# Patient Record
Sex: FEMALE | Race: BLACK OR AFRICAN AMERICAN | NOT HISPANIC OR LATINO | Employment: FULL TIME | URBAN - METROPOLITAN AREA
[De-identification: names, ages, dates, MRNs, and addresses within clinical notes are randomized per-mention and may not be internally consistent; named-entity substitution may affect disease eponyms.]

---

## 2017-09-11 ENCOUNTER — OFFICE VISIT (OUTPATIENT)
Dept: INTERNAL MEDICINE | Facility: MEDICAL CENTER | Age: 47
End: 2017-09-11
Payer: MEDICAID

## 2017-09-11 VITALS
WEIGHT: 206 LBS | BODY MASS INDEX: 33.11 KG/M2 | HEIGHT: 66 IN | HEART RATE: 92 BPM | OXYGEN SATURATION: 98 % | DIASTOLIC BLOOD PRESSURE: 80 MMHG | SYSTOLIC BLOOD PRESSURE: 110 MMHG | TEMPERATURE: 97.5 F

## 2017-09-11 DIAGNOSIS — R73.03 PRE-DIABETES: ICD-10-CM

## 2017-09-11 DIAGNOSIS — Z00.00 HEALTH CARE MAINTENANCE: ICD-10-CM

## 2017-09-11 DIAGNOSIS — E78.5 HYPERLIPIDEMIA, UNSPECIFIED HYPERLIPIDEMIA TYPE: ICD-10-CM

## 2017-09-11 DIAGNOSIS — Z87.42 HISTORY OF OVARIAN CYST: ICD-10-CM

## 2017-09-11 DIAGNOSIS — B00.9 HERPES SIMPLEX: ICD-10-CM

## 2017-09-11 DIAGNOSIS — I10 ESSENTIAL HYPERTENSION: ICD-10-CM

## 2017-09-11 DIAGNOSIS — F32.A DEPRESSION, UNSPECIFIED DEPRESSION TYPE: ICD-10-CM

## 2017-09-11 PROCEDURE — 99204 OFFICE O/P NEW MOD 45 MIN: CPT | Mod: GC | Performed by: INTERNAL MEDICINE

## 2017-09-11 RX ORDER — ACYCLOVIR 800 MG/1
800 TABLET ORAL 2 TIMES DAILY PRN
COMMUNITY
End: 2017-09-11 | Stop reason: SDUPTHER

## 2017-09-11 RX ORDER — POTASSIUM CITRATE 10 MEQ/1
10 TABLET, EXTENDED RELEASE ORAL DAILY
COMMUNITY
End: 2018-02-22

## 2017-09-11 RX ORDER — VALSARTAN AND HYDROCHLOROTHIAZIDE 80; 12.5 MG/1; MG/1
1 TABLET, FILM COATED ORAL DAILY
COMMUNITY
End: 2017-09-11 | Stop reason: SDUPTHER

## 2017-09-11 RX ORDER — PAROXETINE HYDROCHLORIDE 20 MG/1
20 TABLET, FILM COATED ORAL DAILY
COMMUNITY
End: 2018-02-22

## 2017-09-11 RX ORDER — VALSARTAN AND HYDROCHLOROTHIAZIDE 80; 12.5 MG/1; MG/1
1 TABLET, FILM COATED ORAL DAILY
Qty: 30 TAB | Refills: 1 | Status: SHIPPED | OUTPATIENT
Start: 2017-09-11 | End: 2017-10-19 | Stop reason: SDUPTHER

## 2017-09-11 RX ORDER — ACYCLOVIR 800 MG/1
800 TABLET ORAL 2 TIMES DAILY PRN
Qty: 20 TAB | Refills: 0 | Status: SHIPPED | OUTPATIENT
Start: 2017-09-11 | End: 2017-10-19

## 2017-09-11 ASSESSMENT — PATIENT HEALTH QUESTIONNAIRE - PHQ9: CLINICAL INTERPRETATION OF PHQ2 SCORE: 0

## 2017-09-11 NOTE — LETTER
[employer]  Caty Shepherd M.D.  [address]  [place] [address]  Fax: [phone]   Authorization for Release/Disclosure of   Protected Health Information   Name: JORGITO NEWMAN : 1970 SSN: [id number]   Address: [address]  [place] [address] Phone:    [phone] (home)    I authorize the entity listed below to release/disclose the PHI below to:   [facility]/Caty Shepherd M.D. and Caty Shepherd M.D.   Provider or Entity Name:Dr Rsaheed Hawkins   Address                      [address]  City, State, [place], [place]   Phone:[phone]      Fax:     Reason for request: continuity of care   Information to be released:    [  ] LAST COLONOSCOPY,  including any PATH REPORT and follow-up  [  ] LAST FIT/COLOGUARD RESULT [  ] LAST DEXA  [  ] LAST MAMMOGRAM  [ X ] LAST PAP  [  ] LAST LABS [  ] RETINA EXAM REPORT  [  ] IMMUNIZATION RECORDS  [ X ] Release all info      [  ] Check here and initial the line next to each item to release ALL health information INCLUDING  _____ Care and treatment for drug and / or alcohol abuse  _____ HIV testing, infection status, or AIDS  _____ Genetic Testing    DATES OF SERVICE OR TIME PERIOD TO BE DISCLOSED: _____________  I understand and acknowledge that:  * This Authorization may be revoked at any time by you in writing, except if your health information has already been used or disclosed.  * Your health information that will be used or disclosed as a result of you signing this authorization could be re-disclosed by the recipient. If this occurs, your re-disclosed health information may no longer be protected by State or Federal laws.  * You may refuse to sign this Authorization. Your refusal will not affect your ability to obtain treatment.  * This Authorization becomes effective upon signing and will  on (date) __________.      If no date is indicated, this Authorization will  one (1) year from the signature date.    Name:  Elizabeth Bah    Signature:   Date:     9/11/2017       PLEASE FAX REQUESTED RECORDS BACK TO: [phone]

## 2017-09-11 NOTE — LETTER
[employer]  Caty Shepherd M.D.  [address] [facility] [facility]  [place] [address]  Fax: [phone]   Authorization for Release/Disclosure of   Protected Health Information   Name: JORGITO NEWMAN : 1970 SSN: [id number]   Address: [address]  [place] [address] Phone:    [phone] (home)    I authorize the entity listed below to release/disclose the PHI below to:   [facility]/Caty Shepherd M.D. and Caty Shepherd M.D.   Provider or Entity Name:[facility]     Address                        [address]  City, State, [place], [place]   Phone:[phone]      Fax:     Reason for request: continuity of care   Information to be released:    [  ] LAST COLONOSCOPY,  including any PATH REPORT and follow-up  [  ] LAST FIT/COLOGUARD RESULT [  ] LAST DEXA  [  ] LAST MAMMOGRAM  [X ] LAST PAP  [ X] LAST LABS [  ] RETINA EXAM REPORT  [  ] IMMUNIZATION RECORDS  [ X ] Release all info      [  ] Check here and initial the line next to each item to release ALL health information INCLUDING  _____ Care and treatment for drug and / or alcohol abuse  _____ HIV testing, infection status, or AIDS  _____ Genetic Testing    DATES OF SERVICE OR TIME PERIOD TO BE DISCLOSED: _____________  I understand and acknowledge that:  * This Authorization may be revoked at any time by you in writing, except if your health information has already been used or disclosed.  * Your health information that will be used or disclosed as a result of you signing this authorization could be re-disclosed by the recipient. If this occurs, your re-disclosed health information may no longer be protected by State or Federal laws.  * You may refuse to sign this Authorization. Your refusal will not affect your ability to obtain treatment.  * This Authorization becomes effective upon signing and will  on (date) __________.      If no date is indicated, this Authorization will  one (1) year from the signature  date.    Name: Elizabeth RamandeepBotello    Signature:   Date:     9/11/2017       PLEASE FAX REQUESTED RECORDS BACK TO: [phone]

## 2017-09-11 NOTE — PROGRESS NOTES
New Patient to Establish    Reason to establish: New patient to establish    CC: Weight gain in spite of decreased appetite    HPI: Patient is a pleasant 47 year old with past medical history of hypertension, impaired glucose tolerance, hyperlipidemia, ovarian cyst who is here to establish care. Patient just moved to [place] from [place] last month. Today she  reports a decreased appetite and weight gain in spite of that going on for a couple of months. She thinks she is gaining weight around her waist.  Denies dry skin, constipation, sensitivity to cold, sluggishness. Patient also reports feeling thirsty all the time and drinking a lot of water- a full case of 500 ml bottles at times. Denies increased urination, blurry vision.  Patient has not had periods for the last 2 years. She thinks it's related to her history of ovarian cyst. Patient has hot flashes, no vaginal dryness or sleep disturbances. She had labs drawn in April which shows a high FSH. It was explained to the patient that she is going through menopause.     There are no active problems to display for this patient.      Past Medical History:   Diagnosis Date   • Depression    • Hypertension        Current Outpatient Prescriptions   Medication Sig Dispense Refill   • potassium citrate SR (UROCIT-K SR) 10 MEQ (1080 MG) Tab CR Take 10 mEq by mouth every day.     • paroxetine (PAXIL) 20 MG Tab Take 20 mg by mouth every day.     • valsartan-hydrochlorothiazide (DIOVAN-HCT) 80-12.5 MG per tablet Take 1 Tab by mouth every day. 30 Tab 1   • acyclovir (ZOVIRAX) 800 MG Tab Take 1 Tab by mouth 2 times a day as needed (for 6 days when start of symptoms). 20 Tab 0     No current facility-administered medications for this visit.        Allergies as of 09/11/2017   • (Not on File)       Social History     Social History   • Marital status: Single     Spouse name: N/A   • Number of children: N/A   • Years of education: N/A     Occupational History   • Not on file.  "    Social History Main Topics   • Smoking status: Never Smoker   • Smokeless tobacco: Never Used   • Alcohol use Not on file   • Drug use: Unknown   • Sexual activity: Not on file     Other Topics Concern   • Not on file     Social History Narrative   • No narrative on file     Family history:    Maternal grandfather and paternal grandmother- Diabetes  Mother- hypertension    No past surgical history on file.    ROS: As per HPI. Additional pertinent symptoms as noted below.    Eyes: No vision changes, eye redness  ENT: No sore throat, rhinorrhea, ear discharge  Cardiovascular: No chest pain, palpitations, dizziness  Respiratory: no cough, pleuritic chest pain, dyspnea  GI: No abdominal pain, nausea, vomiting, changes in bowel habits  : No urinary frequency, dysuria  Musculo-skeletal: No joint pain or aches  Neurological: No headache, numbness/tingling  Psychological: history of depression    /80   Pulse 92   Temp 36.4 °C (97.5 °F)   Ht 1.676 m (5' 6\")   Wt 93.4 kg (206 lb)   SpO2 98%   BMI 33.25 kg/m²     Physical Exam  General:  Alert and oriented, No apparent distress.    Eyes: Pupils equal and reactive. No scleral icterus.    Throat: Clear no erythema or exudates noted.    Neck: Supple. No lymphadenopathy noted. Thyroid not enlarged.    Lungs: Clear to auscultation and percussion bilaterally.    Cardiovascular: Regular rate and rhythm. No murmurs, rubs or gallops.    Abdomen:  Benign. No rebound or guarding noted.    Extremities: No clubbing, cyanosis, edema.    Skin: Clear. No rash or suspicious skin lesions noted.      Note: I have reviewed all pertinent labs and diagnostic tests associated with this visit with specific comments listed under the assessment and plan below    Assessment and Plan    1. Essential hypertension  - BP in the clinic today is stable- 110/80  - Valsartan/HCTZ-80/12.5 medication refill provided  - Advised patient to monitor BP at home,  - Healthy diet and exercise     2. " History of ovarian cyst  - Patient has a history of ovarian cyst which was removed last August in [place]. As per patient, her doctor wanted to do a transvaginal ultrasound which she could not get done as she was moving.  Plan:  - Obtain transvaginal ultrasound  - Refer to gynecology if found abnormal  - Will obtain records from [place]    3.. Pre-diabetes  - The symptoms of polyuria are most likely related to hyperglycemia  - Will obtain fasting glucose, HbA1c    4. Hyperlipidemia, unspecified hyperlipidemia type  - Fasting lipid panel    5. Depression, unspecified depression type  - Patient was prescribed paroxetine by her doctor in [place], but she never took the medication  - States that her symptoms are related to stress in her life  Plan:  - referral to psychology as patient wants to try therapy before medications    6. Herpes simplex  - Per patient report, she has been diagnosed with HSV 1 and 2, she takes acyclovir as needed    7. Health care maintenance  - Patient had a pap smear last year, will obtain records  - Will obtain a screening mammogram  - She declined the influenza shot      Followup: Return in about 4 weeks (around 10/9/2017).    Signed by: Caty Shepherd M.D.

## 2017-09-14 DIAGNOSIS — Z87.42 HISTORY OF OVARIAN CYST: ICD-10-CM

## 2017-10-02 ENCOUNTER — APPOINTMENT (OUTPATIENT)
Dept: RADIOLOGY | Facility: MEDICAL CENTER | Age: 47
End: 2017-10-02
Attending: STUDENT IN AN ORGANIZED HEALTH CARE EDUCATION/TRAINING PROGRAM
Payer: MEDICAID

## 2017-10-02 DIAGNOSIS — Z87.42 HISTORY OF OVARIAN CYST: ICD-10-CM

## 2017-10-02 PROCEDURE — 76830 TRANSVAGINAL US NON-OB: CPT

## 2017-10-16 ENCOUNTER — APPOINTMENT (OUTPATIENT)
Dept: INTERNAL MEDICINE | Facility: MEDICAL CENTER | Age: 47
End: 2017-10-16
Payer: MEDICAID

## 2017-10-19 ENCOUNTER — OFFICE VISIT (OUTPATIENT)
Dept: INTERNAL MEDICINE | Facility: MEDICAL CENTER | Age: 47
End: 2017-10-19
Payer: MEDICAID

## 2017-10-19 VITALS
WEIGHT: 205 LBS | OXYGEN SATURATION: 95 % | BODY MASS INDEX: 32.95 KG/M2 | SYSTOLIC BLOOD PRESSURE: 133 MMHG | DIASTOLIC BLOOD PRESSURE: 80 MMHG | HEIGHT: 66 IN | TEMPERATURE: 97 F | HEART RATE: 107 BPM | RESPIRATION RATE: 16 BRPM

## 2017-10-19 DIAGNOSIS — R07.9 CHEST PAIN, UNSPECIFIED TYPE: ICD-10-CM

## 2017-10-19 DIAGNOSIS — R73.03 PRE-DIABETES: ICD-10-CM

## 2017-10-19 DIAGNOSIS — I10 ESSENTIAL HYPERTENSION: ICD-10-CM

## 2017-10-19 DIAGNOSIS — E78.5 HYPERLIPIDEMIA, UNSPECIFIED HYPERLIPIDEMIA TYPE: ICD-10-CM

## 2017-10-19 DIAGNOSIS — B00.9 HERPES SIMPLEX INFECTION: ICD-10-CM

## 2017-10-19 DIAGNOSIS — R63.5 WEIGHT GAIN: ICD-10-CM

## 2017-10-19 PROCEDURE — 99214 OFFICE O/P EST MOD 30 MIN: CPT | Mod: GC | Performed by: INTERNAL MEDICINE

## 2017-10-19 RX ORDER — VALSARTAN AND HYDROCHLOROTHIAZIDE 80; 12.5 MG/1; MG/1
1 TABLET, FILM COATED ORAL DAILY
Qty: 30 TAB | Refills: 3 | Status: SHIPPED | OUTPATIENT
Start: 2017-10-19 | End: 2018-01-11 | Stop reason: SDUPTHER

## 2017-10-19 RX ORDER — ACYCLOVIR 400 MG/1
400 TABLET ORAL DAILY
Qty: 30 TAB | Refills: 3 | Status: SHIPPED | OUTPATIENT
Start: 2017-10-19 | End: 2018-12-26 | Stop reason: SDUPTHER

## 2017-10-19 ASSESSMENT — PAIN SCALES - GENERAL: PAINLEVEL: NO PAIN

## 2017-10-19 NOTE — LETTER
[employer]  Caty Shepherd M.D.  [address]  [place] [address]  Fax: [phone]   Authorization for Release/Disclosure of   Protected Health Information   Name: ELIZABETH NEWMAN : 1970 SSN: [id number]   Address: [address]  [place] [address] Phone:    [phone] (home)    I authorize the entity listed below to release/disclose the PHI below to:   [employer]/Caty Shepherd M.D. and Caty Shepherd M.D.   Provider or Entity Name:     Address   City, State, Zip   Phone:      Fax:     Reason for request: continuity of care   Information to be released:    [  ] LAST COLONOSCOPY,  including any PATH REPORT and follow-up  [  ] LAST FIT/COLOGUARD RESULT [  ] LAST DEXA  [  ] LAST MAMMOGRAM  [  ] LAST PAP  [  ] LAST LABS [  ] RETINA EXAM REPORT  [  ] IMMUNIZATION RECORDS  [  ] Release all info      [  ] Check here and initial the line next to each item to release ALL health information INCLUDING  _____ Care and treatment for drug and / or alcohol abuse  _____ HIV testing, infection status, or AIDS  _____ Genetic Testing    DATES OF SERVICE OR TIME PERIOD TO BE DISCLOSED: _____________  I understand and acknowledge that:  * This Authorization may be revoked at any time by you in writing, except if your health information has already been used or disclosed.  * Your health information that will be used or disclosed as a result of you signing this authorization could be re-disclosed by the recipient. If this occurs, your re-disclosed health information may no longer be protected by State or Federal laws.  * You may refuse to sign this Authorization. Your refusal will not affect your ability to obtain treatment.  * This Authorization becomes effective upon signing and will  on (date) __________.      If no date is indicated, this Authorization will  one (1) year from the signature date.    Name: Elizabeth Newman    Signature:   Date:     10/19/2017       PLEASE FAX REQUESTED  RECORDS BACK TO: [phone]

## 2017-10-19 NOTE — LETTER
[employer]  Caty Shepherd M.D.  [address]  [place] [address]  Fax: [phone]   Authorization for Release/Disclosure of   Protected Health Information   Name: ELIZABETH NEWMAN : 1970 SSN: [id number]   Address: [address]  [place] [address] Phone:    [phone] (home)    I authorize the entity listed below to release/disclose the PHI below to:   [employer]/Caty Shepherd M.D. and Caty Shepherd M.D.   Provider or Entity Name:     Address   City, State, Zip   Phone:      Fax:     Reason for request: continuity of care   Information to be released:    [  ] LAST COLONOSCOPY,  including any PATH REPORT and follow-up  [  ] LAST FIT/COLOGUARD RESULT [  ] LAST DEXA  [  ] LAST MAMMOGRAM  [  ] LAST PAP  [  ] LAST LABS [  ] RETINA EXAM REPORT  [  ] IMMUNIZATION RECORDS  [  ] Release all info      [  ] Check here and initial the line next to each item to release ALL health information INCLUDING  _____ Care and treatment for drug and / or alcohol abuse  _____ HIV testing, infection status, or AIDS  _____ Genetic Testing    DATES OF SERVICE OR TIME PERIOD TO BE DISCLOSED: _____________  I understand and acknowledge that:  * This Authorization may be revoked at any time by you in writing, except if your health information has already been used or disclosed.  * Your health information that will be used or disclosed as a result of you signing this authorization could be re-disclosed by the recipient. If this occurs, your re-disclosed health information may no longer be protected by State or Federal laws.  * You may refuse to sign this Authorization. Your refusal will not affect your ability to obtain treatment.  * This Authorization becomes effective upon signing and will  on (date) __________.      If no date is indicated, this Authorization will  one (1) year from the signature date.    Name: Elizabeth Newman    Signature:   Date:     10/19/2017       PLEASE FAX REQUESTED  RECORDS BACK TO: [phone]

## 2017-10-20 NOTE — PROGRESS NOTES
"      Established Patient    Elizabeth presents today with the following:    CC: Chest pain    HPI: Patient is a 47 year old with past medical history of hypertension, pre diabetes, hyperlipidemia and herpes simplex .     Chest pain: Patient reports having intermittent chest pain for one week sometime in the last month. She describes it as substernal pressure type of pain which radiated to her left arm . The pain occurred on exertion ( walking, moving boxes) and lasted a few seconds each time. She had associated shortness of breath. Patient reports being admitted to a hospital ([facility]) in [place] for similar chest pain about 3-4 years back during which time she had a stress test and angiogram. She states that she \" failed\" her stress test.   She was reportedly referred to cardiology. Patient however did not follow up on the referral. She does not remember the results of her stress test or angiogram. She denies ever being asked to take an aspirin or statin. She does not have a chest pain at this time.    Herpes simplex: Patient reports both genital herpes and cold sores. She reports frequent infections as a result of which she now takes acyclovir almost everyday. She denies any symptoms currently.    Weight gain: Patient is concerned about weight gain in spite of not eating too much. She states that she usually weighs around 165-170 lbs and the only time she went above 200 was during her pregnancy. She has not started exercising yet. She is interested in obtaining help from nutrition services to monitor her diet.    Patient Active Problem List    Diagnosis Date Noted   • Essential hypertension 09/11/2017   • History of ovarian cyst 09/11/2017       Current Outpatient Prescriptions   Medication Sig Dispense Refill   • aspirin EC (ECOTRIN) 81 MG Tablet Delayed Response Take 1 Tab by mouth every day. 30 Tab 3   • acyclovir (ZOVIRAX) 400 MG tablet Take 1 Tab by mouth every day. 30 Tab 3   • " "valsartan-hydrochlorothiazide (DIOVAN-HCT) 80-12.5 MG per tablet Take 1 Tab by mouth every day. 30 Tab 3   • potassium citrate SR (UROCIT-K SR) 10 MEQ (1080 MG) Tab CR Take 10 mEq by mouth every day.     • paroxetine (PAXIL) 20 MG Tab Take 20 mg by mouth every day.       No current facility-administered medications for this visit.        ROS: As per HPI. Additional pertinent symptoms as noted below.    Constitutional: No fever, chills, malaise  ENT: No sore throat, headache  Cardiovascular: As per HPI. No palpitations  Respiratory: no cough, sputum production, hemoptsysis  GI: No abdominal pain, nausea, vomiting, diarrhea  : No frequency, urgency, dysuria  Musculo-skeletal: no joint pains  Neurological: No headache, motor weakness, numbness/tingling    /80   Pulse (!) 107   Temp 36.1 °C (97 °F)   Resp 16   Ht 1.676 m (5' 6\")   Wt 93 kg (205 lb)   LMP 10/19/2017   SpO2 95%   Breastfeeding? No   BMI 33.09 kg/m²     Physical Exam   Constitutional:  oriented to person, place, and time. No distress.   Eyes: Pupils are equal, round, and reactive to light. No scleral icterus.  Neck: Neck supple. No thyromegaly present.   Cardiovascular: Normal rate, regular rhythm and normal heart sounds.  Exam reveals no gallop and no friction rub.  No murmur heard.  Pulmonary/Chest: Breath sounds normal. Chest wall is not dull to percussion.   Musculoskeletal:   no edema.   Lymphadenopathy: no cervical adenopathy  Neurological: alert and oriented to person, place, and time.   Skin: No cyanosis. Nails show no clubbing.    Note: I have reviewed all pertinent labs and diagnostic tests associated with this visit with specific comments listed under the assessment and plan below    Assessment and Plan    1. Chest pain, unspecified type  - Concerning for cardiac origin of chest pain considering patient's description of typical chest pain and reported previous history of an abnormal stress test.  - Patient's risk factors include " hypertension, possible hyperlipidemia and pre diabetes. She is a non smoker  - We would like to get a stress test at this time , but patient declined the test.  - Patient was explained about the likely cardiac origin of the chest pain and the need for further work up. She understands the risks of not going ahead with the workup.  - will obtain records from [facility]  - Prescribed aspirin 81 mg daily   - Advised patient to go to the ER in case of chest pain.    2. Weight gain  - Weight gain of 30-40 lbs over the past year in spite of decreased appetite  - Basic labs, TSH ordered  - Referral to nutrition services   - Advised healthy diet and exercise    3. Herpes simplex infection  - Reports frequent infection warranting almost daily acyclovir  - Order changed to 400 mg daily as suppressive therapy    4. Essential hypertension  - BP stable  - Continue valsartan/HCTZ   - Regular exercise    5. Hyperlipidemia, unspecified hyperlipidemia type  - Patient not currently on a statin  - Lipid panel ordered    6. Pre-diabetes  - Patient reports having being diagnosed with pre diabtes  - No previous records available  - Will wait for the results of HbA1c and BMP      Followup: Return in about 5 weeks (around 11/23/2017).      Signed by: Caty Shepherd M.D.

## 2017-11-22 ENCOUNTER — APPOINTMENT (OUTPATIENT)
Dept: INTERNAL MEDICINE | Facility: MEDICAL CENTER | Age: 47
End: 2017-11-22
Payer: MEDICAID

## 2018-01-02 ENCOUNTER — TELEPHONE (OUTPATIENT)
Dept: INTERNAL MEDICINE | Facility: MEDICAL CENTER | Age: 48
End: 2018-01-02

## 2018-01-02 NOTE — TELEPHONE ENCOUNTER
Received fax from [facility] in regards to patient medical record release.  On the form we faxed over for patient we did not write whom we are trying to receive the records from, also we don't have the correct number or fax number on the form.  There is no documentation on patient's chart of where we need the records from.    Can you please verify from me that we do need the records from [facility], and not another place.  I don't want to send patients information to the wrong company.  I tried calling patient to ask her where we needed to get the records from and her phone is disconnected.

## 2018-01-02 NOTE — LETTER
January 3, 2018        Elizabeth Bah    Dear Mrs. Bah,   I am mailing a new request of information for you to sign.  The one that you signed for us back on 10/19/17 is not accepted because we did not write the correct name of the Facility on the form.  Please sign the form and you can mail it to them at the address that is provided on the form for their facility, or if you like you can mail it back to us or you can drop it off at our clinic and I will fax it over to them.  If you have any questions please feel free to reach me at [phone] and ask for Dr. Shepherd's medical assistant.         Thank you,          Loyda Taylor

## 2018-01-03 NOTE — TELEPHONE ENCOUNTER
In my notes from her last visit with me, I have documented that we need to obtain medical records from [facility]. Would you be able to obtain records from there?     Thanks,   Caty (Routing comment)

## 2018-01-03 NOTE — TELEPHONE ENCOUNTER
I will mail the patient a new Release of information so she can sign it and I can fax it to [facility] or patient can mail it to them.

## 2018-01-09 ENCOUNTER — TELEPHONE (OUTPATIENT)
Dept: INTERNAL MEDICINE | Facility: MEDICAL CENTER | Age: 48
End: 2018-01-09

## 2018-01-09 ENCOUNTER — OFFICE VISIT (OUTPATIENT)
Dept: INTERNAL MEDICINE | Facility: MEDICAL CENTER | Age: 48
End: 2018-01-09
Payer: MEDICAID

## 2018-01-09 VITALS
DIASTOLIC BLOOD PRESSURE: 88 MMHG | SYSTOLIC BLOOD PRESSURE: 138 MMHG | HEIGHT: 66 IN | OXYGEN SATURATION: 96 % | WEIGHT: 215 LBS | BODY MASS INDEX: 34.55 KG/M2 | TEMPERATURE: 96.8 F | HEART RATE: 85 BPM

## 2018-01-09 DIAGNOSIS — H92.09 EAR ACHE: ICD-10-CM

## 2018-01-09 PROCEDURE — 99213 OFFICE O/P EST LOW 20 MIN: CPT | Mod: GE | Performed by: INTERNAL MEDICINE

## 2018-01-09 RX ORDER — HYDROXYZINE HYDROCHLORIDE 25 MG/1
25 TABLET, FILM COATED ORAL 3 TIMES DAILY PRN
Qty: 30 TAB | Refills: 0 | Status: SHIPPED | OUTPATIENT
Start: 2018-01-09 | End: 2018-01-11 | Stop reason: SINTOL

## 2018-01-09 NOTE — PATIENT INSTRUCTIONS
"Otitis Media With Effusion  Otitis media with effusion is the presence of fluid in the middle ear. This is a common problem in children, which often follows ear infections. It may be present for weeks or longer after the infection. Unlike an acute ear infection, otitis media with effusion refers only to fluid behind the ear drum and not infection. Children with repeated ear and sinus infections and allergy problems are the most likely to get otitis media with effusion.  CAUSES   The most frequent cause of the fluid buildup is dysfunction of the eustachian tubes. These are the tubes that drain fluid in the ears to the back of the nose (nasopharynx).  SYMPTOMS   · The main symptom of this condition is hearing loss. As a result, you or your child may:  ¨ Listen to the TV at a loud volume.  ¨ Not respond to questions.  ¨ Ask \"what\" often when spoken to.  ¨ Mistake or confuse one sound or word for another.  · There may be a sensation of fullness or pressure but usually not pain.  DIAGNOSIS   · Your health care provider will diagnose this condition by examining you or your child's ears.  · Your health care provider may test the pressure in you or your child's ear with a tympanometer.  · A hearing test may be conducted if the problem persists.  TREATMENT   · Treatment depends on the duration and the effects of the effusion.  · Antibiotics, decongestants, nose drops, and cortisone-type drugs (tablets or nasal spray) may not be helpful.  · Children with persistent ear effusions may have delayed language or behavioral problems. Children at risk for developmental delays in hearing, learning, and speech may require referral to a specialist earlier than children not at risk.  · You or your child's health care provider may suggest a referral to an ear, nose, and throat surgeon for treatment. The following may help restore normal hearing:  ¨ Drainage of fluid.  ¨ Placement of ear tubes (tympanostomy tubes).  ¨ Removal of adenoids " (adenoidectomy).  HOME CARE INSTRUCTIONS   · Avoid secondhand smoke.  · Infants who are  are less likely to have this condition.  · Avoid feeding infants while they are lying flat.  · Avoid known environmental allergens.  · Avoid people who are sick.  SEEK MEDICAL CARE IF:   · Hearing is not better in 3 months.  · Hearing is worse.  · Ear pain.  · Drainage from the ear.  · Dizziness.  MAKE SURE YOU:   · Understand these instructions.  · Will watch your condition.  · Will get help right away if you are not doing well or get worse.     This information is not intended to replace advice given to you by your health care provider. Make sure you discuss any questions you have with your health care provider.     Document Released: 01/25/2006 Document Revised: 01/08/2016 Document Reviewed: 07/15/2014  [employer] Interactive Patient Education ©2016 [employer]

## 2018-01-09 NOTE — TELEPHONE ENCOUNTER
1. Caller Name: patient                      Call Back Number: [phone] (home)       2. Message: patient was seen this morning and given a prescription for itching which she is unaware about since she came in for a ear ache. Please call patient to discuss medication.       3. Patient approves office to leave a detailed voicemail/MyChart message: N\A

## 2018-01-09 NOTE — PROGRESS NOTES
"      Established Patient    Elizabeth presents today with the following:    CC: Ear ache x 2 days.     HPI: 47 yo  presents to 2 the clinic for ear ache x 2 days.  According to her, it started out with severe pain with blockage sensation. She feels fullness in her ears, has difficulty understanding the voices of the people. It feels like she was under the pool. She denies any discharge from the ear. She also feels nasal blockage w/o any charge. She denies any sore throat, cough, fever or chill.   She offers no other issues this visit.     Patient Active Problem List    Diagnosis Date Noted   • Herpes simplex infection 10/19/2017   • Essential hypertension 09/11/2017   • History of ovarian cyst 09/11/2017       Current Outpatient Prescriptions   Medication Sig Dispense Refill   • antipyrine-benzocaine (AURALGAN) otic solution Place 1-4 Drops in ear every 2 hours as needed. 1 Bottle 0   • hydrOXYzine HCl (ATARAX) 25 MG Tab Take 1 Tab by mouth 3 times a day as needed for Itching. 30 Tab 0   • oxymetazoline (AFRIN, PATIENT SUPPLIED,) 0.05% Solution Spray 1 Spray in nose 1 time daily as needed. 1 Bottle 0   • acyclovir (ZOVIRAX) 400 MG tablet Take 1 Tab by mouth every day. 30 Tab 3   • valsartan-hydrochlorothiazide (DIOVAN-HCT) 80-12.5 MG per tablet Take 1 Tab by mouth every day. 30 Tab 3   • aspirin EC (ECOTRIN) 81 MG Tablet Delayed Response Take 1 Tab by mouth every day. 30 Tab 3   • potassium citrate SR (UROCIT-K SR) 10 MEQ (1080 MG) Tab CR Take 10 mEq by mouth every day.     • paroxetine (PAXIL) 20 MG Tab Take 20 mg by mouth every day.       No current facility-administered medications for this visit.        ROS: As per HPI.     /88   Pulse 85   Temp 36 °C (96.8 °F)   Ht 1.676 m (5' 6\")   Wt 97.5 kg (215 lb)   SpO2 96%   BMI 34.70 kg/m²     Physical Exam   Constitutional:  Well developed, female, not in acute distress.   Eyes: Pupils are equal, round, and reactive to light.  ENT: R Ears: Ext " canal mild erythema, absence of pus/discharge/wax; Tympanic membrane: intact, dull, sign of effusion           L ear: Ext canal mild erythema,absence of pus/discharge/wax, Tympanic membrane: intact, no dull.           Mastoid process are non tender b/l           Nose: congested nasal mucosa. No discharge noted.            Pharynx: No tonsillar exudate or erythema noted. Posterior pharyngeal wall normal.  Neck: Neck supple. No LN enlargement  Cardiovascular: Normal rate, regular rhythm and normal heart sounds. No m/g/r  Pulmonary/Chest: Breath sounds vesicular on auscultation bilaterally  Musculoskeletal:   no edema.   Neurological: alert and oriented to person, place, and time.   Skin: No cyanosis. Nails show no clubbing.      Note: I have reviewed all pertinent labs and diagnostic tests associated with this visit with specific comments listed under the assessment and plan below    Assessment and Plan    1. Ear ache  Due to Otitis media with effusion. (R>>L)  No sign of erythema/buldging noted.  Plan:  Started on hydroxyzine 25 mg tid x 3 days.  Oxymetazoline nasal spray ordered as nasal decongestant ( use no more than 5 days)  Auralgan otic drop for ear ache  RTC if symptoms not improved or worsen.      Followup: No Follow-up on file.      Signed by: Rupesh Gracia M.D.

## 2018-01-09 NOTE — LETTER
January 9, 2018         Patient: Elizabeth Bah   YOB: 1970   Date of Visit: 1/9/2018           To Whom it May Concern:    Elizabeth Bah was seen in my clinic on 1/9/2018. She may return to work on 1/11/18.    If you have any questions or concerns, please don't hesitate to call.        Sincerely,           Rupesh Gracia M.D.  Electronically Signed

## 2018-01-09 NOTE — TELEPHONE ENCOUNTER
Unable to reach over the phone. Left a voice mail. I gave the medication for her ear issue. It is used for itching as well as in condition like otitis media with effusion. It is an antihistamine group of medication. It helps with the decreasing the nasal passage swelling. If she has any issue/intolerance, I will change the medicine to different type of antihistamine med like Claritin, zyrtec.

## 2018-01-11 ENCOUNTER — OFFICE VISIT (OUTPATIENT)
Dept: INTERNAL MEDICINE | Facility: MEDICAL CENTER | Age: 48
End: 2018-01-11
Payer: MEDICAID

## 2018-01-11 VITALS
OXYGEN SATURATION: 96 % | TEMPERATURE: 96.9 F | DIASTOLIC BLOOD PRESSURE: 70 MMHG | BODY MASS INDEX: 34.55 KG/M2 | HEART RATE: 75 BPM | SYSTOLIC BLOOD PRESSURE: 128 MMHG | WEIGHT: 215 LBS | HEIGHT: 66 IN

## 2018-01-11 DIAGNOSIS — H66.91 OTITIS OF RIGHT EAR: ICD-10-CM

## 2018-01-11 DIAGNOSIS — H92.09 EAR ACHE: ICD-10-CM

## 2018-01-11 DIAGNOSIS — I10 ESSENTIAL HYPERTENSION: ICD-10-CM

## 2018-01-11 PROCEDURE — 99213 OFFICE O/P EST LOW 20 MIN: CPT | Mod: GE | Performed by: INTERNAL MEDICINE

## 2018-01-11 RX ORDER — VALSARTAN AND HYDROCHLOROTHIAZIDE 80; 12.5 MG/1; MG/1
1 TABLET, FILM COATED ORAL DAILY
Qty: 30 TAB | Refills: 0 | Status: SHIPPED | OUTPATIENT
Start: 2018-01-11 | End: 2018-02-22 | Stop reason: SDUPTHER

## 2018-01-11 RX ORDER — AMOXICILLIN 875 MG/1
875 TABLET, COATED ORAL 2 TIMES DAILY
Qty: 6 TAB | Refills: 0 | Status: SHIPPED | OUTPATIENT
Start: 2018-01-11 | End: 2018-01-14

## 2018-01-11 RX ORDER — LORATADINE 10 MG/1
10 TABLET ORAL DAILY
Qty: 30 TAB | Refills: 0 | Status: SHIPPED | OUTPATIENT
Start: 2018-01-11 | End: 2018-02-22

## 2018-01-11 RX ORDER — VALSARTAN AND HYDROCHLOROTHIAZIDE 80; 12.5 MG/1; MG/1
1 TABLET, FILM COATED ORAL DAILY
Qty: 30 TAB | Refills: 0 | Status: SHIPPED | OUTPATIENT
Start: 2018-01-11 | End: 2018-01-11 | Stop reason: SDUPTHER

## 2018-01-11 NOTE — LETTER
January 11, 2018        Elizabeth Bah  [id number]  [place] [address]        To whomever it may concern,     Ms. Elizabeth Bah was seen in the clinic urgently for illness on 1/11/17. She is able to return to work on Saturday 1/13/17.     If you have any questions or concerns, please don't hesitate to call.        Sincerely,        Ritchie Wilson M.D.    Electronically Signed

## 2018-01-12 NOTE — PATIENT INSTRUCTIONS
Take amoxicillin two times daily in case your ear pain/stuffiness does not improve in two days.   Use Afrin for decongestion   Use salt water gargling for throat pain   Loratidine for stuffiness and sensation of spinning

## 2018-01-13 NOTE — PROGRESS NOTES
"      Established Patient    Elizabeth presents today with the following:    CC: ear pain    HPI:     Elizabeth Bha is a very pleasant 49 yo woman with PMHx of HSV and HTN presents to clinic with continuing ear pain x 4 days now. She was seen in clinic two days ago by Dr. Rupesh Gracia and was prescribed oral decongestant spray along with ear drops, but had difficulty filling them at her pharmacy.     Again, this pain started with some limited tinnitus, sensation of \"being under water\", heaviness in her ears (right worse than left) and some pain. She had associated vertigo that is resolved now. Denies any fevers, chills, nausea, vomiting, dizziness, lightheadedness or syncope.     Patient is non toxic appearing. Continued congestion and some ear pain, but she has not yet taken any of the medications except hydroxyzine. She wants to stop hydroxyzine due to some drowsiness.       Patient Active Problem List    Diagnosis Date Noted   • Herpes simplex infection 10/19/2017   • Essential hypertension 09/11/2017   • History of ovarian cyst 09/11/2017       Current Outpatient Prescriptions   Medication Sig Dispense Refill   • oxymetazoline (AFRIN, PATIENT SUPPLIED,) 0.05% Solution Spray 1 Spray in nose 1 time daily as needed. 1 Bottle 0   • amoxicillin (AMOXIL) 875 MG tablet Take 1 Tab by mouth 2 times a day for 3 days. 6 Tab 0   • loratadine (CLARITIN) 10 MG Tab Take 1 Tab by mouth every day. 30 Tab 0   • antipyrine-benzocaine (AURALGAN) otic solution Place 1-4 Drops in ear every 2 hours as needed. 1 Bottle 0   • valsartan-hydrochlorothiazide (DIOVAN-HCT) 80-12.5 MG per tablet Take 1 Tab by mouth every day. 30 Tab 0   • acyclovir (ZOVIRAX) 400 MG tablet Take 1 Tab by mouth every day. 30 Tab 3   • aspirin EC (ECOTRIN) 81 MG Tablet Delayed Response Take 1 Tab by mouth every day. 30 Tab 3   • potassium citrate SR (UROCIT-K SR) 10 MEQ (1080 MG) Tab CR Take 10 mEq by mouth every day.     • paroxetine (PAXIL) 20 MG Tab Take 20 mg " "by mouth every day.       No current facility-administered medications for this visit.        ROS: As per HPI. Additional pertinent symptoms as noted below.        /70   Pulse 75   Temp 36.1 °C (96.9 °F)   Ht 1.676 m (5' 6\")   Wt 97.5 kg (215 lb)   SpO2 96%   BMI 34.70 kg/m²     Physical Exam   Constitutional:  oriented to person, place, and time. No distress.   Eyes: Pupils are equal, round, and reactive to light. No scleral icterus.  Neck: Neck supple. No thyromegaly present.   Cardiovascular: Normal rate, regular rhythm and normal heart sounds.  Exam reveals no gallop and no friction rub.  No murmur heard.  Pulmonary/Chest: Breath sounds normal. Chest wall is not dull to percussion.   Musculoskeletal:   no edema.   Lymphadenopathy: no cervical adenopathy  Neurological: alert and oriented to person, place, and time.   Skin: No cyanosis. Nails show no clubbing    Physical Exam   HENT:   Head: Normocephalic and atraumatic.   Right Ear: Hearing, external ear and ear canal normal. No lacerations. No drainage, swelling or tenderness. No foreign bodies. No mastoid tenderness. Tympanic membrane is not injected, not scarred, not perforated, not erythematous, not retracted and not bulging. Tympanic membrane mobility is normal. A middle ear effusion is present. No hemotympanum. No decreased hearing is noted.   Left Ear: Hearing, tympanic membrane, external ear and ear canal normal. No lacerations. No drainage, swelling or tenderness. No foreign bodies. No mastoid tenderness. Tympanic membrane is not injected, not scarred, not perforated, not erythematous, not retracted and not bulging. Tympanic membrane mobility is normal.  No middle ear effusion. No hemotympanum. No decreased hearing is noted.   Mouth/Throat: Uvula is midline, oropharynx is clear and moist and mucous membranes are normal. She does not have dentures. No oral lesions. No trismus in the jaw. Normal dentition. Dental caries present. No dental " abscesses or uvula swelling. No oropharyngeal exudate, posterior oropharyngeal edema, posterior oropharyngeal erythema or tonsillar abscesses. Tonsils are 1+ on the right. Tonsils are 1+ on the left. No tonsillar exudate.         Note: I have reviewed all pertinent labs and diagnostic tests associated with this visit with specific comments listed under the assessment and plan below    Assessment and Plan    1. Ear ache   2. Otitis of right ear  - acute otitis media with middle ear effusion (still with R>L), no ruptured tympanic membrane or erythema or significant bulging noted   - NO evidence of [place] syndrome on physical exam -- no evidence of vesicular lesions in ear canal although pt with hx of HSV (on chronic suppressant therapy)    - oxymetazoline (AFRIN, PATIENT SUPPLIED,) 0.05% Solution; Spray 1 Spray in nose 1 time daily as needed.  Dispense: 1 Bottle; Refill: 0   - amoxicillin (AMOXIL) 875 MG tablet; Take 1 Tab by mouth 2 times a day for 3 days.  Dispense: 6 Tab; Refill: 0   - antipyrine-benzocaine (AURALGAN) otic solution; Place 1-4 Drops in ear every 2 hours as needed.  Dispense: 1 Bottle; Refill: 0   - discontinue hydroxyzine due to side effects (somnolence)   - again, RTC if symptoms worsen       3. Essential hypertension  - refilled, continue to f/u with PCP Dr. Shepherd   - valsartan-hydrochlorothiazide (DIOVAN-HCT) 80-12.5 MG per tablet; Take 1 Tab by mouth every day.  Dispense: 30 Tab; Refill: 0        Followup: No Follow-up on file.      Signed by: Ritchie Wilson M.D.

## 2018-02-01 ENCOUNTER — OFFICE VISIT (OUTPATIENT)
Dept: INTERNAL MEDICINE | Facility: MEDICAL CENTER | Age: 48
End: 2018-02-01
Payer: MEDICAID

## 2018-02-01 VITALS
WEIGHT: 213.8 LBS | BODY MASS INDEX: 34.36 KG/M2 | DIASTOLIC BLOOD PRESSURE: 91 MMHG | OXYGEN SATURATION: 98 % | TEMPERATURE: 97.1 F | HEART RATE: 107 BPM | SYSTOLIC BLOOD PRESSURE: 130 MMHG | HEIGHT: 66 IN

## 2018-02-01 DIAGNOSIS — N30.00 ACUTE CYSTITIS WITHOUT HEMATURIA: ICD-10-CM

## 2018-02-01 PROCEDURE — 99213 OFFICE O/P EST LOW 20 MIN: CPT | Mod: GE | Performed by: INTERNAL MEDICINE

## 2018-02-01 RX ORDER — NITROFURANTOIN 25; 75 MG/1; MG/1
100 CAPSULE ORAL 2 TIMES DAILY
Qty: 10 CAP | Refills: 0 | Status: SHIPPED | OUTPATIENT
Start: 2018-02-01 | End: 2018-02-06

## 2018-02-01 NOTE — LETTER
February 1, 2018         Patient: Elizabeth Bah   YOB: 1970   Date of Visit: 2/1/2018           To Whom it May Concern:    Elizabeth Bah was seen in my clinic on 2/1/2018. She can return to work 2/2/2018.      If you have any questions or concerns, please don't hesitate to call.        Sincerely,           Izabela Santillan M.D.  Electronically Signed

## 2018-02-01 NOTE — PATIENT INSTRUCTIONS
Please follow up in 5 weeks with PCP, Dr. Shepherd.    Please obtain previously ordered bloodwork.

## 2018-02-02 NOTE — PROGRESS NOTES
"      Established Patient    Elizabeth presents today with the following:    CC: Burning on urination.    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  She also notes increased urinary frequency ×3 months.  Denies fever/chills, abdominal pain.  Reports suprapubic pain, dizziness, and nausea x3-7 days.  Denies hematuria.      Her PCP, Dr. Shepherd, has previously ordered blood work for her, which the patient states she has not been able to obtain.  Patient also notes increase in thirst ×3 months. She reports she was previously in the prediabetes range and is concerned for diabetes, stating she will \"definitely get [her] lab work done tomorrow.\"   Denies chest pain/palpitations, shortness of breath/difficulty breathing. Patient does report she had an ear infection that was treated in mid January with amoxicillin, with symptom resolution.      Patient Active Problem List    Diagnosis Date Noted   • Herpes simplex infection 10/19/2017   • Essential hypertension 09/11/2017   • History of ovarian cyst 09/11/2017       Current Outpatient Prescriptions   Medication Sig Dispense Refill   • nitrofurantoin monohydr macro (MACROBID) 100 MG Cap Take 1 Cap by mouth 2 times a day for 5 days. 10 Cap 0   • oxymetazoline (AFRIN, PATIENT SUPPLIED,) 0.05% Solution Spray 1 Spray in nose 1 time daily as needed. 1 Bottle 0   • loratadine (CLARITIN) 10 MG Tab Take 1 Tab by mouth every day. 30 Tab 0   • antipyrine-benzocaine (AURALGAN) otic solution Place 1-4 Drops in ear every 2 hours as needed. 1 Bottle 0   • valsartan-hydrochlorothiazide (DIOVAN-HCT) 80-12.5 MG per tablet Take 1 Tab by mouth every day. 30 Tab 0   • aspirin EC (ECOTRIN) 81 MG Tablet Delayed Response Take 1 Tab by mouth every day. 30 Tab 3   • acyclovir (ZOVIRAX) 400 MG tablet Take 1 Tab by mouth every day. 30 Tab 3   • potassium citrate SR (UROCIT-K SR) 10 MEQ " "(1080 MG) Tab CR Take 10 mEq by mouth every day.     • paroxetine (PAXIL) 20 MG Tab Take 20 mg by mouth every day.       No current facility-administered medications for this visit.        ROS: 12 point review of systems negative except as noted in HPI and below.    Endo: Positive for polydipsia.  : Positive for suprapubic pain, increased urinary frequency, burning on urination.      /91   Pulse (!) 107   Temp 36.2 °C (97.1 °F)   Ht 1.676 m (5' 6\")   Wt 97 kg (213 lb 12.8 oz)   SpO2 98%   BMI 34.51 kg/m²     Physical Exam   Constitutional:  oriented to person, place, and time. No distress.   Eyes: Pupils are equal, round, and reactive to light. No scleral icterus.  Neck: Neck supple. No thyromegaly present.   Cardiovascular: Normal rate, regular rhythm and normal heart sounds.  Exam reveals no gallop and no friction rub.  No murmur heard.  Pulmonary/Chest: Breath sounds normal. Chest wall is not dull to percussion.   Abdomen: Bowel sounds present, nontender, nondistended. No rebound, no rigidity.  No CVA tenderness.    : Suprapubic tenderness present.  Musculoskeletal:   no edema.   Lymphadenopathy: no cervical adenopathy  Neurological: alert and oriented to person, place, and time. Cranial nerves 2-12 grossly intact. Cerebellar function intact. Romberg negative. Motor and sensory function grossly intact.  Skin: No cyanosis. Nails show no clubbing.    Note: I have reviewed all pertinent labs and diagnostic tests associated with this visit with specific comments listed under the assessment and plan below    Assessment and Plan    1. Acute cystitis without hematuria  - Patient reports burning on urination ×2-3 days, increased urinary frequency ×3 months, dizziness x1 week.    - Previously seen by PCP, Dr. Shepherd, who has ordered blood work for her, which patient has not yet been able to obtain.    - Physical exam: Patient afebrile, suprapubic pain present. No costovertebral angle tenderness.  - Will start " nitrofurantoin 100 mg twice daily ×5 days.  - Patient to call office if suprapubic pain and burning on urination persist.     2. Possible diabetes  - Patient reports polyuria and polydipsia ×3 months.   - Patient reports she was previously in the prediabetes range and is concerned for diabetes.  States she will follow through with labwork previously ordered by her PCP, Dr. Shepherd, tomorrow.  - Office, Dr. Shepherd, to follow up with patient if patient within diabetes range and requiring medication.    Followup: Return in about 5 weeks (around 3/8/2018).    Signed by: Izabela Santillan M.D.

## 2018-02-05 ENCOUNTER — TELEPHONE (OUTPATIENT)
Dept: INTERNAL MEDICINE | Facility: MEDICAL CENTER | Age: 48
End: 2018-02-05

## 2018-02-05 DIAGNOSIS — R63.5 WEIGHT GAIN: ICD-10-CM

## 2018-02-05 DIAGNOSIS — I10 ESSENTIAL HYPERTENSION: ICD-10-CM

## 2018-02-05 DIAGNOSIS — R73.03 PRE-DIABETES: ICD-10-CM

## 2018-02-05 DIAGNOSIS — D25.9 UTERINE LEIOMYOMA, UNSPECIFIED LOCATION: ICD-10-CM

## 2018-02-05 NOTE — TELEPHONE ENCOUNTER
Elizabeth Bah  [phone] (home)     Patient called and stated that her blood pressure has been high within the past 3 days.  She wants to know if it has to do anything with her blood work because she is taking blood pressure every morning.  Patient would like a call back from you with her results.  Thank you

## 2018-02-06 NOTE — TELEPHONE ENCOUNTER
I called the patient and went over the results with her. Asked her to continue monitoring her BP and if persistently high, to make an appointment with us.

## 2018-02-22 ENCOUNTER — OFFICE VISIT (OUTPATIENT)
Dept: INTERNAL MEDICINE | Facility: MEDICAL CENTER | Age: 48
End: 2018-02-22
Payer: MEDICAID

## 2018-02-22 VITALS
DIASTOLIC BLOOD PRESSURE: 86 MMHG | HEART RATE: 86 BPM | HEIGHT: 66 IN | TEMPERATURE: 98.2 F | SYSTOLIC BLOOD PRESSURE: 128 MMHG | BODY MASS INDEX: 34.36 KG/M2 | WEIGHT: 213.8 LBS | OXYGEN SATURATION: 97 % | RESPIRATION RATE: 14 BRPM

## 2018-02-22 DIAGNOSIS — R51.9 NONINTRACTABLE HEADACHE, UNSPECIFIED CHRONICITY PATTERN, UNSPECIFIED HEADACHE TYPE: ICD-10-CM

## 2018-02-22 DIAGNOSIS — E87.6 HYPOKALEMIA: ICD-10-CM

## 2018-02-22 DIAGNOSIS — Z01.89 LABORATORY TEST: ICD-10-CM

## 2018-02-22 DIAGNOSIS — I10 ESSENTIAL HYPERTENSION: ICD-10-CM

## 2018-02-22 PROCEDURE — 99214 OFFICE O/P EST MOD 30 MIN: CPT | Mod: GC | Performed by: INTERNAL MEDICINE

## 2018-02-22 RX ORDER — POTASSIUM CHLORIDE 1.5 G/1.58G
20 POWDER, FOR SOLUTION ORAL DAILY
Qty: 30 PACKET | Refills: 0 | Status: SHIPPED | OUTPATIENT
Start: 2018-02-22

## 2018-02-22 RX ORDER — VALSARTAN AND HYDROCHLOROTHIAZIDE 80; 12.5 MG/1; MG/1
1.5 TABLET, FILM COATED ORAL DAILY
Qty: 45 TAB | Refills: 0 | Status: SHIPPED | OUTPATIENT
Start: 2018-02-22 | End: 2018-06-07 | Stop reason: SDUPTHER

## 2018-02-22 ASSESSMENT — PAIN SCALES - GENERAL: PAINLEVEL: NO PAIN

## 2018-02-23 NOTE — PROGRESS NOTES
Established Patient    Elizabeth presents today with the following:    CC: elevated BP  PCP: Dr. Shepherd    HPI:   Ms. Bah is a 49 y/o F with PMHx of HTN who presents today for evaluation of elevated BP readings at home.  She states for the past few weeks her BP has been running in 140/90s and she notices occasional headaches which resolve on their own without intervention.  She does report that she has had a toothache for the past few days which she is being seen by a dentist for and is currently on abx for with plans for extraction.  She also reports that her distant vision has been getting worse for some time now and that she was told by her doctor in [facility] that she should get her eyes checked but has not yet done that.  She denies nausea, vomiting, abdominal pain, chest pain, decreased urinary output, dyspnea, dizziness, syncope, etc.  She does bring her lab work in today for review and is worried about her high cholesterol.  She does not smoke and is not exposed to much secondhand smoke.  She has no personal or family h/o CAD, DM, or CVA.  She does have chronically low K and states she is not very compliant with her supplements due to them making her feel bad.  She has been working on losing weight and is good about watching her diet and maintaining low salt intake.    Patient Active Problem List    Diagnosis Date Noted   • Headache 02/22/2018   • Hypokalemia 02/22/2018   • Herpes simplex infection 10/19/2017   • Essential hypertension 09/11/2017   • History of ovarian cyst 09/11/2017       Current Outpatient Prescriptions   Medication Sig Dispense Refill   • valsartan-hydrochlorothiazide (DIOVAN-HCT) 80-12.5 MG per tablet Take 1.5 Tabs by mouth every day. 45 Tab 0   • potassium chloride (KLOR-CON) 20 MEQ Pack Take 1 Packet by mouth every day. 30 Packet 0   • acyclovir (ZOVIRAX) 400 MG tablet Take 1 Tab by mouth every day. 30 Tab 3     No current facility-administered medications for this visit.        ROS: As  "per HPI. Additional pertinent symptoms as noted below.  Constitutional: no fevers, chills, weight change  Eyes: + progressive blurred distant vision, no discharge, eye pain  ENT: no rhinorrhea, sore throat, neck masses  Cardiovascular: no angina, palpitations, PND, orthopnea, edema  Respiratory: no cough, sputum, or dyspnea  GI: no nausea, vomiting, abdominal pain, constipation, or diarrhea  : no dysuria, hematuria, frequency   Musculo-skeletal: no joint or muscle pain  Skin: no rashes or open wounds  Neurological: + headaches, no dizziness, motor/sensory loss  Psychological: no anxiety or depression  All others negative    /86   Pulse 86   Temp 36.8 °C (98.2 °F)   Resp 14   Ht 1.676 m (5' 6\")   Wt 97 kg (213 lb 12.8 oz)   SpO2 97%   BMI 34.51 kg/m²     Physical Exam   GEN: patient is in no acute distress   HEAD: normocephalic, atraumatic; no tenderness  EYES: PERRL, EOMI; fundoscopic exam normal  THROAT: moist oral mucosa, no pharyngeal exudates or erythema  NECK: supple, no lymphadenopathy, no thyroid enlargement; no tendeness  CVS: regular rate and rhythm, no murmur/rubs/gallops  LUNGS: CTABL, no rales/ronchi   ABD: soft, nontender, no guarding/rebound/rigidity, no organomegaly, BS(+)   NEURO: A&Ox4, no focal neurological deficits   EXT: no pedal edema, clubbing, or cyanosis   SKIN: skin intact, no suspicious rashes or lesions   MSK: no paravertebral tenderness, full ROM   PSYCH: mood and affect normal       Assessment and Plan    1. Essential hypertension  - patient states BP running in 140/90s at home with occasional headaches  - has also been suffering from toothache which is to be extracted by dentist and she is on abx for, but high BP started before that  - BP normal in clinic today, but patient reports high BP at last dental visit too  - will increase valsartan-HCTZ to 1.5 tabs QD; patient has had poor tolerance to amlodipine and losartan in past and is reluctant to try another med  - diet " and exercise encouraged along with low salt diet  - f/u with PCP In 2 weeks with BP logs and bring BP cuff in for callibration    2. Nonintractable headache, unspecified chronicity pattern, unspecified headache type  - headache may be related to BP, but likely related to worsening distance vision as patient reports her vision is worsening though she does not feel like her eyes are straining  - she has been told by her previous provider in [facility] to have her eyes checked  - patient to make eye appt before next f/u    3. Hypokalemia  - 2/2 HCTZ, but patient states this is one of the few BP meds she has been able to tolerate  - K 3.3 in labs from today  - patient noncompliant with K supplement due to them making her feel bad  - have changed K supplement to klor-con powder for patient to try for daily use and given patient info on high K foods to incorporate into her diet  - f/u with PCP In 2 weeks with repeat BMP and Mg    4. Laboratory test  - review of lab tests shows normal CBC and BMP aside from hypokalemia as above, normal TSH/A1c, and ASCVD risk of 3.3%  - results discussed with patient in detail      Follow Up: Return if symptoms worsen or fail to improve.    Signed by: Jacqueline Vaughn M.D.

## 2018-02-23 NOTE — PATIENT INSTRUCTIONS
- increase BP tablet to 1.5 pills per day and keep BP logs and bring your cuff in to the next visit so you can get it callibrated  - if BP is high, take 2 more readings 1 minute apart and write down all readings  - if BP is persistently over 140/90, you can go up to 2 pills  - increase potassium rich foods in your diet  - take packet of potassium once a day  - make appt with eye doctor  - get labs done before next visit.    Potassium Content of Foods  Potassium is a mineral found in many foods and drinks. It helps keep fluids and minerals balanced in your body and affects how steadily your heart beats. Potassium also helps control your blood pressure and keep your muscles and nervous system healthy.  Certain health conditions and medicines may change the balance of potassium in your body. When this happens, you can help balance your level of potassium through the foods that you do or do not eat. Your health care provider or dietitian may recommend an amount of potassium that you should have each day. The following lists of foods provide the amount of potassium (in parentheses) per serving in each item.  HIGH IN POTASSIUM   The following foods and beverages have 200 mg or more of potassium per serving:  · Apricots, 2 raw or 5 dry (200 mg).  · Artichoke, 1 medium (345 mg).  · Avocado, raw,  ¼ each (245 mg).  · Banana, 1 medium (425 mg).  · Beans, lima, or baked beans, canned, ½ cup (280 mg).  · Beans, white, canned, ½ cup (595 mg).  · Beef roast, 3 oz (320 mg).  · Beef, ground, 3 oz (270 mg).  · Beets, raw or cooked, ½ cup (260 mg).  · Bran muffin, 2 oz (300 mg).  · Broccoli, ½ cup (230 mg).  · [place] sprouts, ½ cup (250 mg).  · Cantaloupe, ½ cup (215 mg).  · Cereal, 100% bran, ½ cup (200-400 mg).  · Cheeseburger, single, fast food, 1 each (225-400 mg).  · Chicken, 3 oz (220 mg).  · Clams, canned, 3 oz (535 mg).  · Crab, 3 oz (225 mg).  · Dates, 5 each (270 mg).  · Dried beans and peas, ½ cup (300-475 mg).  · Figs,  dried, 2 each (260 mg).  · Fish: halibut, tuna, cod, snapper, 3 oz (480 mg).  · Fish: salmon, chanda, swordfish, perch, 3 oz (300 mg).  · Fish, tuna, canned 3 oz (200 mg).  · French fries, fast food, 3 oz (470 mg).  · Granola with fruit and nuts, ½ cup (200 mg).  · Grapefruit juice, ½ cup (200 mg).  · Greens, beet, ½ cup (655 mg).  · Honeydew melon, ½ cup (200 mg).  · Kale, raw, 1 cup (300 mg).  · Kiwi, 1 medium (240 mg).  · Kohlrabi, rutabaga, parsnips, ½ cup (280 mg).  · Lentils, ½ cup (365 mg).  · [place], 1 each (325 mg).  · Milk, chocolate, 1 cup (420 mg).  · Milk: nonfat, low-fat, whole, buttermilk, 1 cup (350-380 mg).  · Molasses, 1 Tbsp (295 mg).  · Mushrooms, ½ cup (280) mg.  · Nectarine, 1 each (275 mg).  · Nuts: almonds, peanuts, hazelnuts, [place], cashew, mixed, 1 oz (200 mg).  · Nuts, pistachios, 1 oz (295 mg).  · Orange, 1 each (240 mg).  · Orange juice, ½ cup (235 mg).  · Papaya, medium, ½ fruit (390 mg).  · Peanut butter, chunky, 2 Tbsp (240 mg).  · Peanut butter, smooth, 2 Tbsp (210 mg).  · Pear, 1 medium (200 mg).  · Pomegranate, 1 whole (400 mg).  · Pomegranate juice, ½ cup (215 mg).  · Pork, 3 oz (350 mg).  · Potato chips, salted, 1 oz (465 mg).  · Potato, baked with skin, 1 medium (925 mg).  · Potatoes, boiled, ½ cup (255 mg).  · Potatoes, mashed, ½ cup (330 mg).  · Prune juice, ½ cup (370 mg).  · Prunes, 5 each (305 mg).  · Pudding, chocolate, ½ cup (230 mg).  · Pumpkin, canned, ½ cup (250 mg).  · Raisins, seedless, ¼ cup (270 mg).  · Seeds, sunflower or pumpkin, 1 oz (240 mg).  · Soy milk, 1 cup (300 mg).  · Spinach, ½ cup (420 mg).  · Spinach, canned, ½ cup (370 mg).  · Sweet potato, baked with skin, 1 medium (450 mg).  · Swiss chard, ½ cup (480 mg).  · Tomato or vegetable juice, ½ cup (275 mg).  · Tomato sauce or puree, ½ cup (400-550 mg).  · Tomato, raw, 1 medium (290 mg).  · Tomatoes, canned, ½ cup (200-300 mg).  · Turkey, 3 oz (250 mg).  · Wheat germ, 1 oz (250 mg).  · Winter squash, ½ cup  (250 mg).  · Yogurt, plain or fruited, 6 oz (260-435 mg).  · Zucchini, ½ cup (220 mg).  MODERATE IN POTASSIUM  The following foods and beverages have  mg of potassium per serving:  · Apple, 1 each (150 mg).  · Apple juice, ½ cup (150 mg).  · Applesauce, ½ cup (90 mg).  · Apricot nectar, ½ cup (140 mg).  · Asparagus, small webb, ½ cup or 6 webb (155 mg).  · Bagel, cinnamon raisin, 1 each (130 mg).  · Bagel, egg or plain, 4 in., 1 each (70 mg).  · Beans, green, ½ cup (90 mg).  · Beans, yellow, ½ cup (190 mg).  · Beer, regular, 12 oz (100 mg).  · Beets, canned, ½ cup (125 mg).  · Blackberries, ½ cup (115 mg).  · Blueberries, ½ cup (60 mg).  · Bread, whole wheat, 1 slice (70 mg).  · Broccoli, raw, ½ cup (145 mg).  · Cabbage, ½ cup (150 mg).  · Carrots, cooked or raw, ½ cup (180 mg).  · Cauliflower, raw, ½ cup (150 mg).  · Celery, raw, ½ cup (155 mg).  · Cereal, bran flakes, ½cup (120-150 mg).  · Cheese, cottage, ½ cup (110 mg).  · Cherries, 10 each (150 mg).  · Chocolate, 1½ oz bar (165 mg).  · Coffee, brewed 6 oz (90 mg).  · Corn, ½ cup or 1 ear (195 mg).  · Cucumbers, ½ cup (80 mg).  · Egg, large, 1 each (60 mg).  · Eggplant, ½ cup (60 mg).  · Endive, raw, ½cup (80 mg).  · English muffin, 1 each (65 mg).  · Fish, orange roughy, 3 oz (150 mg).  · Frankfurter, beef or pork, 1 each (75 mg).  · Fruit cocktail, ½ cup (115 mg).  · Grape juice, ½ cup (170 mg).  · Grapefruit, ½ fruit (175 mg).  · Grapes, ½ cup (155 mg).  · Greens: kale, turnip, paxton, ½ cup (110-150 mg).  · Ice cream or frozen yogurt, chocolate, ½ cup (175 mg).  · Ice cream or frozen yogurt, vanilla, ½ cup (120-150 mg).  · [place]ra, limes, 1 each (80 mg).  · Lettuce, all types, 1 cup (100 mg).  · Mixed vegetables, ½ cup (150 mg).  · Mushrooms, raw, ½ cup (110 mg).  · Nuts: walnuts, pecans, or macadamia, 1 oz (125 mg).  · Oatmeal, ½ cup (80 mg).  · Okra, ½ cup (110 mg).  · Onions, raw, ½ cup (120 mg).  · Peach, 1 each (185 mg).  · Peaches, canned, ½  cup (120 mg).  · Pears, canned, ½ cup (120 mg).  · Peas, green, frozen, ½ cup (90 mg).  · Peppers, green, ½ cup (130 mg).  · Peppers, red, ½ cup (160 mg).  · Pineapple juice, ½ cup (165 mg).  · Pineapple, fresh or canned, ½ cup (100 mg).  · Plums, 1 each (105 mg).  · Pudding, vanilla, ½ cup (150 mg).  · Raspberries, ½ cup (90 mg).  · Rhubarb, ½ cup (115 mg).  · Rice, wild, ½ cup (80 mg).  · Shrimp, 3 oz (155 mg).  · Spinach, raw, 1 cup (170 mg).  · Strawberries, ½ cup (125 mg).  · Summer squash ½ cup (175-200 mg).  · Swiss chard, raw, 1 cup (135 mg).  · Tangerines, 1 each (140 mg).  · Tea, brewed, 6 oz (65 mg).  · Turnips, ½ cup (140 mg).  · Watermelon, ½ cup (85 mg).  · Wine, red, table, 5 oz (180 mg).  · Wine, white, table, 5 oz (100 mg).  LOW IN POTASSIUM  The following foods and beverages have less than 50 mg of potassium per serving.  · Bread, white, 1 slice (30 mg).  · Carbonated beverages, 12 oz (less than 5 mg).  · Cheese, 1 oz (20-30 mg).  · Cranberries, ½ cup (45 mg).  · Cranberry juice cocktail, ½ cup (20 mg).  · Fats and oils, 1 Tbsp (less than 5 mg).  · Hummus, 1 Tbsp (32 mg).  · Nectar: papaya, vikas, or pear, ½ cup (35 mg).  · Rice, white or brown, ½ cup (50 mg).  · Spaghetti or macaroni, ½ cup cooked (30 mg).  · Tortilla, flour or corn, 1 each (50 mg).  · Waffle, 4 in., 1 each (50 mg).  · Water chestnuts, ½ cup (40 mg).     This information is not intended to replace advice given to you by your health care provider. Make sure you discuss any questions you have with your health care provider.     Document Released: 08/01/2006 Document Revised: 12/23/2014 Document Reviewed: 11/14/2014  Elsevier Interactive Patient Education ©2016 [employer]

## 2018-03-14 ENCOUNTER — TELEPHONE (OUTPATIENT)
Dept: INTERNAL MEDICINE | Facility: MEDICAL CENTER | Age: 48
End: 2018-03-14

## 2018-03-14 NOTE — TELEPHONE ENCOUNTER
DOCUMENTATION OF PAR STATUS:    1. Name of Medication & Dose: Valsartan-hydrochlorothiazide 80-12.5mg     2. Name of Prescription Coverage Company & phone #: Medicaid through [employer]    3. Date Prior Auth Submitted: 3/14/2018    4. What information was given to obtain insurance decision? ICD10 Codes and medications information    5. Prior Auth Letter Approved or Denied? Pending    6. Action Taken: Pharmacy/Patient Notified: Yes

## 2018-04-19 ENCOUNTER — NON-PROVIDER VISIT (OUTPATIENT)
Dept: OCCUPATIONAL MEDICINE | Facility: CLINIC | Age: 48
End: 2018-04-19

## 2018-04-19 DIAGNOSIS — Z11.1 ENCOUNTER FOR PPD TEST: Primary | ICD-10-CM

## 2018-04-19 PROCEDURE — 86580 TB INTRADERMAL TEST: CPT | Performed by: PREVENTIVE MEDICINE

## 2018-04-19 NOTE — PROGRESS NOTES
Per Kanika due to confusion w/ Diana it is okay to have the patient come back tomorrow to finish other services for the company. Per Diana we were only to do a TB Skin test. Kanika called and stated that d/s and physical were to be completed as well. I explained to her that I can gp ahead and do it but the patient had gone to the bathroom and left before she called. She stated that the patient will come in tomorrow to finish the rest and that it was okay with her. Spoke with her at 03:06 pm

## 2018-04-21 ENCOUNTER — NON-PROVIDER VISIT (OUTPATIENT)
Dept: URGENT CARE | Facility: CLINIC | Age: 48
End: 2018-04-21

## 2018-04-21 LAB — TB WHEAL 3D P 5 TU DIAM: NORMAL MM

## 2018-04-28 ENCOUNTER — NON-PROVIDER VISIT (OUTPATIENT)
Dept: URGENT CARE | Facility: CLINIC | Age: 48
End: 2018-04-28

## 2018-04-28 DIAGNOSIS — Z11.1 PPD SCREENING TEST: ICD-10-CM

## 2018-04-28 PROCEDURE — 86580 TB INTRADERMAL TEST: CPT | Performed by: EMERGENCY MEDICINE

## 2018-05-01 ENCOUNTER — NON-PROVIDER VISIT (OUTPATIENT)
Dept: OCCUPATIONAL MEDICINE | Facility: CLINIC | Age: 48
End: 2018-05-01

## 2018-05-01 LAB — TB WHEAL 3D P 5 TU DIAM: NORMAL MM

## 2018-06-07 DIAGNOSIS — I10 ESSENTIAL HYPERTENSION: ICD-10-CM

## 2018-06-07 NOTE — TELEPHONE ENCOUNTER
Last seen: 02/22/18 by Dr. Jade  Next appt: None     Was the patient seen in the last year in this department? Yes   Does patient have an active prescription for medications requested? No   Received Request Via: Pharmacy

## 2018-06-08 RX ORDER — VALSARTAN AND HYDROCHLOROTHIAZIDE 80; 12.5 MG/1; MG/1
1.5 TABLET, FILM COATED ORAL DAILY
Qty: 45 TAB | Refills: 1 | Status: SHIPPED | OUTPATIENT
Start: 2018-06-08 | End: 2018-09-15 | Stop reason: SDUPTHER

## 2018-09-15 DIAGNOSIS — I10 ESSENTIAL HYPERTENSION: ICD-10-CM

## 2018-09-17 RX ORDER — VALSARTAN AND HYDROCHLOROTHIAZIDE 80; 12.5 MG/1; MG/1
TABLET, FILM COATED ORAL
Qty: 45 TAB | Refills: 1 | Status: SHIPPED | OUTPATIENT
Start: 2018-09-17 | End: 2018-09-18

## 2018-09-17 NOTE — TELEPHONE ENCOUNTER
Pharmacy called and asked if we can send something else in because the valsartan was recalled.  Please send something else in ASAP because patient only has one pill left.

## 2018-09-17 NOTE — TELEPHONE ENCOUNTER
Last seen: 02/22/18 by Dr. Vaughn  Next appt: None     Was the patient seen in the last year in this department? Yes   Does patient have an active prescription for medications requested? No   Received Request Via: Pharmacy

## 2018-09-18 ENCOUNTER — TELEPHONE (OUTPATIENT)
Dept: INTERNAL MEDICINE | Facility: MEDICAL CENTER | Age: 48
End: 2018-09-18

## 2018-09-18 DIAGNOSIS — I10 ESSENTIAL HYPERTENSION: ICD-10-CM

## 2018-09-18 RX ORDER — LOSARTAN POTASSIUM 100 MG/1
100 TABLET ORAL DAILY
Qty: 30 TAB | Refills: 0 | Status: SHIPPED | OUTPATIENT
Start: 2018-09-18 | End: 2018-09-18

## 2018-09-18 RX ORDER — HYDROCHLOROTHIAZIDE 12.5 MG/1
12.5 CAPSULE, GELATIN COATED ORAL DAILY
Qty: 30 CAP | Refills: 1 | Status: SHIPPED | OUTPATIENT
Start: 2018-09-18 | End: 2018-09-26 | Stop reason: SDUPTHER

## 2018-09-18 RX ORDER — LOSARTAN POTASSIUM 50 MG/1
50 TABLET ORAL DAILY
Qty: 30 TAB | Refills: 1 | Status: SHIPPED | OUTPATIENT
Start: 2018-09-18 | End: 2018-09-26 | Stop reason: SDUPTHER

## 2018-09-18 NOTE — TELEPHONE ENCOUNTER
Losartan 100 mg and hydrochlorothiazide 12.5 mg daily prescribed. Please let patient know to take an appointment with me in 2 weeks to assess response to the new medications.

## 2018-09-25 ENCOUNTER — APPOINTMENT (OUTPATIENT)
Dept: INTERNAL MEDICINE | Facility: MEDICAL CENTER | Age: 48
End: 2018-09-25
Payer: MEDICAID

## 2018-09-26 ENCOUNTER — OFFICE VISIT (OUTPATIENT)
Dept: INTERNAL MEDICINE | Facility: MEDICAL CENTER | Age: 48
End: 2018-09-26
Payer: MEDICAID

## 2018-09-26 VITALS
TEMPERATURE: 98.3 F | HEIGHT: 66 IN | OXYGEN SATURATION: 97 % | SYSTOLIC BLOOD PRESSURE: 130 MMHG | DIASTOLIC BLOOD PRESSURE: 91 MMHG | WEIGHT: 211.4 LBS | HEART RATE: 83 BPM | BODY MASS INDEX: 33.97 KG/M2

## 2018-09-26 DIAGNOSIS — M79.7 FIBROMYALGIA: ICD-10-CM

## 2018-09-26 DIAGNOSIS — I10 ESSENTIAL HYPERTENSION: ICD-10-CM

## 2018-09-26 PROCEDURE — 99214 OFFICE O/P EST MOD 30 MIN: CPT | Mod: GC | Performed by: INTERNAL MEDICINE

## 2018-09-26 RX ORDER — HYDROCHLOROTHIAZIDE 12.5 MG/1
12.5 CAPSULE, GELATIN COATED ORAL DAILY
Qty: 90 CAP | Refills: 0 | Status: SHIPPED | OUTPATIENT
Start: 2018-09-26 | End: 2019-06-13 | Stop reason: SDUPTHER

## 2018-09-26 RX ORDER — DULOXETIN HYDROCHLORIDE 30 MG/1
30 CAPSULE, DELAYED RELEASE ORAL DAILY
Qty: 30 CAP | Refills: 0 | Status: SHIPPED | OUTPATIENT
Start: 2018-09-26 | End: 2018-12-26 | Stop reason: SDUPTHER

## 2018-09-26 RX ORDER — LOSARTAN POTASSIUM 50 MG/1
50 TABLET ORAL DAILY
Qty: 90 TAB | Refills: 0 | Status: SHIPPED | OUTPATIENT
Start: 2018-09-26 | End: 2019-06-13 | Stop reason: SDUPTHER

## 2018-09-26 ASSESSMENT — PATIENT HEALTH QUESTIONNAIRE - PHQ9: CLINICAL INTERPRETATION OF PHQ2 SCORE: 0

## 2018-09-26 ASSESSMENT — PAIN SCALES - GENERAL: PAINLEVEL: 6=MODERATE PAIN

## 2018-09-26 NOTE — PATIENT INSTRUCTIONS
Myofascial Pain Syndrome and Fibromyalgia  Introduction  Myofascial pain syndrome and fibromyalgia are both pain disorders. This pain may be felt mainly in your muscles.  · Myofascial pain syndrome:  ¨ Always has trigger points or tender points in the muscle that will cause pain when pressed. The pain may come and go.  ¨ Usually affects your neck, upper back, and shoulder areas. The pain often radiates into your arms and hands.  · Fibromyalgia:  ¨ Has muscle pains and tenderness that come and go.  ¨ Is often associated with fatigue and sleep disturbances.  ¨ Has trigger points.  ¨ Tends to be long-lasting (chronic), but is not life-threatening.  Fibromyalgia and myofascial pain are not the same. However, they often occur together. If you have both conditions, each can make the other worse. Both are common and can cause enough pain and fatigue to make day-to-day activities difficult.  What are the causes?  The exact causes of fibromyalgia and myofascial pain are not known. People with certain gene types may be more likely to develop fibromyalgia. Some factors can be triggers for both conditions, such as:  · Spine disorders.  · Arthritis.  · Severe injury (trauma) and other physical stressors.  · Being under a lot of stress.  · A medical illness.  What are the signs or symptoms?  Fibromyalgia   The main symptom of fibromyalgia is widespread pain and tenderness in your muscles. This can vary over time. Pain is sometimes described as stabbing, shooting, or burning. You may have tingling or numbness, too. You may also have sleep problems and fatigue. You may wake up feeling tired and groggy (fibro fog). Other symptoms may include:  · Bowel and bladder problems.  · Headaches.  · Visual problems.  · Problems with odors and noises.  · Depression or mood changes.  · Painful menstrual periods (dysmenorrhea).  · Dry skin or eyes.  Myofascial pain syndrome   Symptoms of myofascial pain syndrome include:  · Tight, ropy bands  of muscle.  · Uncomfortable sensations in muscular areas, such as:  ¨ Aching.  ¨ Cramping.  ¨ Burning.  ¨ Numbness.  ¨ Tingling.  ¨ Muscle weakness.  · Trouble moving certain muscles freely (range of motion).  How is this diagnosed?  There are no specific tests to diagnose fibromyalgia or myofascial pain syndrome. Both can be hard to diagnose because their symptoms are common in many other conditions. Your health care provider may suspect one or both of these conditions based on your symptoms and medical history. Your health care provider will also do a physical exam.  The key to diagnosing fibromyalgia is having pain, fatigue, and other symptoms for more than three months that cannot be explained by another condition.  The key to diagnosing myofascial pain syndrome is finding trigger points in muscles that are tender and cause pain elsewhere in your body (referred pain).  How is this treated?  Treating fibromyalgia and myofascial pain often requires a team of health care providers. This usually starts with your primary provider and a physical therapist. You may also find it helpful to work with alternative health care providers, such as massage therapists or acupuncturists.  Treatment for fibromyalgia may include medicines. This may include nonsteroidal anti-inflammatory drugs (NSAIDs), along with other medicines.  Treatment for myofascial pain may also include:  · NSAIDs.  · Cooling and stretching of muscles.  · Trigger point injections.  · Sound wave (ultrasound) treatments to stimulate muscles.  Follow these instructions at home:  · Take medicines only as directed by your health care provider.  · Exercise as directed by your health care provider or physical therapist.  · Try to avoid stressful situations.  · Practice relaxation techniques to control your stress. You may want to try:  ¨ Biofeedback.  ¨ Visual imagery.  ¨ Hypnosis.  ¨ Muscle relaxation.  ¨ Yoga.  ¨ Meditation.  · Talk to your health care provider  about alternative treatments, such as acupuncture or massage treatment.  · Maintain a healthy lifestyle. This includes eating a healthy diet and getting enough sleep.  · Consider joining a support group.  · Do not do activities that stress or strain your muscles. That includes repetitive motions and heavy lifting.  Where to find more information:  · National Fibromyalgia Association: www.fmaware.org  · Arthritis Foundation: www.arthritis.org  · American Chronic Pain Association: www.theacpa.org/condition/myofascial-pain  Contact a health care provider if:  · You have new symptoms.  · Your symptoms get worse.  · You have side effects from your medicines.  · You have trouble sleeping.  · Your condition is causing depression or anxiety.  This information is not intended to replace advice given to you by your health care provider. Make sure you discuss any questions you have with your health care provider.  Document Released: 12/18/2006 Document Revised: 05/25/2017 Document Reviewed: 09/23/2015  © 2017 Elsevier

## 2018-09-26 NOTE — LETTER
September 26, 2018        Elizabeth Bah  [address]  [place] [address]        Dear Elizabeth's employer       Patient was seen at the clinic on 9/26/18. Please excuse from work.  If you have any questions or concerns, please don't hesitate to call.        Sincerely,        Beti Rockwell M.D.    Electronically Signed

## 2018-10-03 LAB
ALBUMIN SERPL-MCNC: 4.3 G/DL (ref 3.5–5.5)
ALBUMIN/GLOB SERPL: 1.2 {RATIO} (ref 1.2–2.2)
ALP SERPL-CCNC: 99 IU/L (ref 39–117)
ALT SERPL-CCNC: 15 IU/L (ref 0–32)
AMBIG ABBREV CMP14 DFLT   977206: NORMAL
ANA SER QL: NEGATIVE
AST SERPL-CCNC: 17 IU/L (ref 0–40)
BILIRUB SERPL-MCNC: 0.4 MG/DL (ref 0–1.2)
BUN SERPL-MCNC: 11 MG/DL (ref 6–24)
BUN/CREAT SERPL: 12 (ref 9–23)
CALCIUM SERPL-MCNC: 9.7 MG/DL (ref 8.7–10.2)
CHLORIDE SERPL-SCNC: 103 MMOL/L (ref 96–106)
CO2 SERPL-SCNC: 24 MMOL/L (ref 20–29)
CREAT SERPL-MCNC: 0.89 MG/DL (ref 0.57–1)
CRP SERPL-MCNC: 1.5 MG/L (ref 0–4.9)
ERYTHROCYTE [SEDIMENTATION RATE] IN BLOOD BY WESTERGREN METHOD: 25 MM/HR (ref 0–32)
GLOBULIN SER CALC-MCNC: 3.6 G/DL (ref 1.5–4.5)
GLUCOSE SERPL-MCNC: 94 MG/DL (ref 65–99)
IF AFRICAN AMERICAN  100797: 88 ML/MIN/1.73
IF NON AFRICAN AMER 100791: 76 ML/MIN/1.73
POTASSIUM SERPL-SCNC: 3.6 MMOL/L (ref 3.5–5.2)
PROT SERPL-MCNC: 7.9 G/DL (ref 6–8.5)
SODIUM SERPL-SCNC: 141 MMOL/L (ref 134–144)

## 2018-10-15 ENCOUNTER — TELEPHONE (OUTPATIENT)
Dept: INTERNAL MEDICINE | Facility: MEDICAL CENTER | Age: 48
End: 2018-10-15

## 2018-10-15 NOTE — TELEPHONE ENCOUNTER
VOICEMAIL  1. Caller Name: Elizabeth Bah   Call Back Number: [phone] (home)       2. Message: Would like a note stating she has fibromyalgia.    3. Patient approves office to leave a detailed voicemail/MyChart message: N\A

## 2018-10-15 NOTE — TELEPHONE ENCOUNTER
Patient called back and asked if she can get her last note when she was into see us.  I explained she can call our Medical records department and they will get her note for her.     Patient verbalized understanding.

## 2019-06-11 DIAGNOSIS — B00.9 HERPES SIMPLEX INFECTION: ICD-10-CM

## 2019-06-12 RX ORDER — ACYCLOVIR 400 MG/1
TABLET ORAL
Qty: 30 TAB | Refills: 0 | OUTPATIENT
Start: 2019-06-12

## 2019-06-13 DIAGNOSIS — I10 ESSENTIAL HYPERTENSION: ICD-10-CM

## 2019-06-13 NOTE — TELEPHONE ENCOUNTER
Last seen: 9/26/18 by Dr. Covington  Next appt: 06/21/19 with Dr. Antonio    Was the patient seen in the last year in this department? Yes   Does patient have an active prescription for medications requested? No   Received Request Via: Patient    Patient called and left voicemail asking if we can send in  A refill on her medications because she is out , until she sees Dr. Antonio.       PCP Please advise.

## 2019-06-14 DIAGNOSIS — E87.6 HYPOKALEMIA: ICD-10-CM

## 2019-06-14 RX ORDER — HYDROCHLOROTHIAZIDE 12.5 MG/1
12.5 CAPSULE, GELATIN COATED ORAL DAILY
Qty: 90 CAP | Refills: 0 | Status: SHIPPED | OUTPATIENT
Start: 2019-06-14

## 2019-06-14 RX ORDER — LOSARTAN POTASSIUM 50 MG/1
50 TABLET ORAL DAILY
Qty: 90 TAB | Refills: 0 | Status: SHIPPED | OUTPATIENT
Start: 2019-06-14

## 2019-06-14 RX ORDER — POTASSIUM CHLORIDE 1.5 G/1.58G
20 POWDER, FOR SOLUTION ORAL DAILY
Qty: 30 PACKET | Refills: 0 | OUTPATIENT
Start: 2019-06-14

## 2019-06-14 NOTE — TELEPHONE ENCOUNTER
Medications for hypertension have been refilled. BMP ordered to check potassium levels. Please ask patient to get this done prior to her appointment . She can be prescribed potassium tablets if it continues to be low.

## 2019-06-14 NOTE — PROGRESS NOTES
Caty Shepherd M.D.   [facility] 2 hours ago (12:07 PM)      Order for BMP placed. Please mail lab slip to the patient if needed (Routing comment)

## 2019-06-21 ENCOUNTER — APPOINTMENT (OUTPATIENT)
Dept: INTERNAL MEDICINE | Facility: MEDICAL CENTER | Age: 49
End: 2019-06-21